# Patient Record
Sex: MALE | URBAN - METROPOLITAN AREA
[De-identification: names, ages, dates, MRNs, and addresses within clinical notes are randomized per-mention and may not be internally consistent; named-entity substitution may affect disease eponyms.]

---

## 2017-01-11 ENCOUNTER — PRE VISIT (OUTPATIENT)
Dept: OTOLARYNGOLOGY | Facility: CLINIC | Age: 37
End: 2017-01-11

## 2017-01-11 NOTE — TELEPHONE ENCOUNTER
1.  Date/reason for appt: 2/2/17  Velopharyngeal incompetence   2.  Referring provider: JORDAN BLUE  3.  Call to patient (Yes / No - short description): no, referral   4.  Previous care at / records requested from: [facility], [facility]   5.  Other: 1/4/17, 5/2/16 notes received from , will forward to clinic.  Xray video swallow on 1/8/16- [facility]     Will need to request records/images from [facility].

## 2017-01-31 NOTE — TELEPHONE ENCOUNTER
Records received from [facility], will forward to clinic. Waiting for images.   Included:   CT head brain on 2/14/16  SLP note on 6/16/16  Labs